# Patient Record
Sex: MALE
[De-identification: names, ages, dates, MRNs, and addresses within clinical notes are randomized per-mention and may not be internally consistent; named-entity substitution may affect disease eponyms.]

---

## 2023-02-27 PROBLEM — Z00.00 ENCOUNTER FOR PREVENTIVE HEALTH EXAMINATION: Status: ACTIVE | Noted: 2023-02-27

## 2023-02-28 ENCOUNTER — APPOINTMENT (OUTPATIENT)
Dept: OTOLARYNGOLOGY | Facility: CLINIC | Age: 43
End: 2023-02-28
Payer: COMMERCIAL

## 2023-02-28 VITALS — WEIGHT: 210 LBS | HEIGHT: 72 IN | BODY MASS INDEX: 28.44 KG/M2

## 2023-02-28 DIAGNOSIS — J32.3 CHRONIC SPHENOIDAL SINUSITIS: ICD-10-CM

## 2023-02-28 DIAGNOSIS — R09.82 POSTNASAL DRIP: ICD-10-CM

## 2023-02-28 PROCEDURE — 31231 NASAL ENDOSCOPY DX: CPT

## 2023-02-28 PROCEDURE — 99203 OFFICE O/P NEW LOW 30 MIN: CPT | Mod: 25

## 2023-02-28 NOTE — ASSESSMENT
[FreeTextEntry1] : While he clearly has recurrent acute pansinusitis he is not actively having an infection at this time.\par The pressure behind his eyes may be related to his sphenoid sinusitis versus a primary headache syndrome versus potentially a side effect from sleep disturbed breathing.\par He is going to have a sleep study.\par I am going to send him for CT scan of his paranasal sinuses.\par Pending his clinical course the treatment plan will be developed for him.

## 2023-02-28 NOTE — PROCEDURE
[FreeTextEntry6] : PROCEDURE NOTES\par \par PROCEDURE: Nasal endoscopy \par SURGEON: Dr. Madrid\par INDICATIONS: Assess for chronic sinusitis. \par ANESTHESIA: The patient was placed in a sitting position.  Following application of the topical anesthetic and decongestant, exam was performed with a zero degree endoscope.  The scope was passed along the right nasal floor to the nasopharynx.  It was then passed into the region of the middle meatus, middle turbinate, and sphenoethmoid region.  An identical procedure was performed on the left side.  The following findings were noted:\par \par The nasal mucosa was healthy appearing and the septum was roughly midline. The middle meatus and sphenoethmoid recesses were clear bilaterally. The Superior meatus was without lesion or infection.  The Inferior, Middle and Superior Turbinates were not enlarged and healthy in appearance.  There was not pus, polyps or mass.  The nasopharynx was normal. \par \par \par

## 2023-02-28 NOTE — CONSULT LETTER
[Dear  ___] : Dear  [unfilled], [Consult Letter:] : I had the pleasure of evaluating your patient, [unfilled]. [Please see my note below.] : Please see my note below. [Consult Closing:] : Thank you very much for allowing me to participate in the care of this patient.  If you have any questions, please do not hesitate to contact me. [Sincerely,] : Sincerely, [FreeTextEntry3] : Eddie Madrid MD\par [facility]- Co-Director\par [facility] Physician [facility]

## 2023-03-16 ENCOUNTER — APPOINTMENT (OUTPATIENT)
Dept: OTOLARYNGOLOGY | Facility: CLINIC | Age: 43
End: 2023-03-16
Payer: COMMERCIAL

## 2023-03-16 DIAGNOSIS — J32.9 CHRONIC SINUSITIS, UNSPECIFIED: ICD-10-CM

## 2023-03-16 PROCEDURE — 99213 OFFICE O/P EST LOW 20 MIN: CPT

## 2023-03-16 NOTE — CONSULT LETTER
[Dear  ___] : Dear  [unfilled], [Courtesy Letter:] : I had the pleasure of seeing your patient, [unfilled], in my office today. [Please see my note below.] : Please see my note below. [Sincerely,] : Sincerely, [FreeTextEntry1] : \par \par \par Enclosed- ct report. [FreeTextEntry3] : Eddie Madrid MD\par [facility]- Co-Director\par [facility] Physician [facility]

## 2023-03-16 NOTE — HISTORY OF PRESENT ILLNESS
[de-identified] : Initial visit, referred in consultation.\par His chief complaint is "nasal drip, mucus during sleep, sinus pain/infection in the past".\par He begins his history by reporting to me that sometime ago he had what sounds like an odontogenic right maxillary sinusitis.  According to him it was "drained" by an oral surgeon.\par He intermittently gets sinus infection.\par He wakes up in the morning with significant postnasal drip.\par He has never had formal allergy testing.\par According to him his wife is witnessing sleep disturbed breathing and he is being scheduled for a sleep study.\par \par He is also complaining of bilateral pressure in the back of his head.\par He does not have a history of headache syndromes.  He is not complaining of any visual aura. [FreeTextEntry1] : \par \par 03/16/2023 \par mark Comes in in follow-up today.  Had imaging of his sinuses as suggested.  I reviewed the imaging with him.  There is no evidence of sinus mucosal disease acute or chronic.\par He does have an S-shaped deviated septum with bilateral inferior and bilateral middle turbinate hypertrophy.\par Narrow outflow tracts.\par \par Once again his most compelling complaint is pressure in the back of his head.\par \par He has not had a sleep study yet.\par \par He also reports that he has persistent snoring.

## 2023-03-16 NOTE — ASSESSMENT
[FreeTextEntry1] : I have explained to him that I believe his symptoms are related to either a primary neurologic headache syndrome and/or a combination of obstructive sleep apnea syndrome.\par He will have a sleep study and get those results to me, according to him it is scheduled in the near future.\par \par Furthermore I have referred him for formal headache work-up.\par \par At this time I essentially ruled out a sinus etiology as cause of his head pressure.\par He does have recurrent sinusitis not by imaging.\par He gets sinus infection he will present to me.\par \par He may benefit from improvement of his nasal airway as it relates to his sleep disturbed breathing.\par He will be in touch with me after his sleep study.

## 2024-10-28 ENCOUNTER — APPOINTMENT (OUTPATIENT)
Dept: ORTHOPEDIC SURGERY | Facility: CLINIC | Age: 44
End: 2024-10-28
Payer: COMMERCIAL

## 2024-10-28 DIAGNOSIS — Z78.9 OTHER SPECIFIED HEALTH STATUS: ICD-10-CM

## 2024-10-28 DIAGNOSIS — M20.012 MALLET FINGER OF LEFT FINGER(S): ICD-10-CM

## 2024-10-28 PROCEDURE — 29130 APPL FINGER SPLINT STATIC: CPT | Mod: LT

## 2024-10-28 PROCEDURE — 99203 OFFICE O/P NEW LOW 30 MIN: CPT | Mod: 25

## 2024-12-09 ENCOUNTER — APPOINTMENT (OUTPATIENT)
Dept: ORTHOPEDIC SURGERY | Facility: CLINIC | Age: 44
End: 2024-12-09
Payer: COMMERCIAL

## 2024-12-09 DIAGNOSIS — M20.012 MALLET FINGER OF LEFT FINGER(S): ICD-10-CM

## 2024-12-09 PROCEDURE — 29130 APPL FINGER SPLINT STATIC: CPT | Mod: F4

## 2024-12-09 PROCEDURE — 99213 OFFICE O/P EST LOW 20 MIN: CPT | Mod: 25

## 2024-12-09 PROCEDURE — 73140 X-RAY EXAM OF FINGER(S): CPT | Mod: LT
